# Patient Record
(demographics unavailable — no encounter records)

---

## 2022-04-11 NOTE — EDPHYS
Physician Documentation                                                                           

 [facility]                                                                 

Name: Snow Huerta                                                                             

Age: 57 yrs                                                                                       

Sex: Female                                                                                       

: 1964                                                                                   

MRN: [id number]                                                                                   

Arrival Date: 2022                                                                          

Time: 10:43                                                                                       

Account#: [id number]                                                                            

Bed 9                                                                                             

Private MD:                                                                                       

ED Physician Basim Mack                                                                         

HPI:                                                                                              

                                                                                             

10:48 This 57 yrs old Female presents to ER via Ambulatory with complaints of Nose Bleed.     jmm 

10:48 The patient presents with a nose bleed, that is apparently anterior. Onset: The         jmm 

      symptoms/episode began/occurred yesterday. Modifying factors: The symptoms are              

      alleviated by nothing. the symptoms are aggravated by nothing. This is a 57-year-old        

      female with history of hypertension the presents emerged department with complaints of      

      nosebleed. Nosebleed initially began last night which was relieved with pressure.           

      Nosebleed returned this morning and patient was unable to control the the bleed             

      herself..                                                                                   

                                                                                                  

Historical:                                                                                       

- Allergies:                                                                                      

10:51 No Known Allergies;                                                                     jd3 

- Home Meds:                                                                                      

10:51 lisinopril Oral [Active];                                                               jd3 

- PMHx:                                                                                           

10:51 Hypertension;                                                                           jd3 

                                                                                                  

- Immunization history:: Adult Immunizations up to date, Client reports having NOT                

  received the Covid vaccine. Flu vaccine is not up to date.                                      

- Social history:: Smoking status: Patient denies any tobacco usage or history of.                

                                                                                                  

                                                                                                  

ROS:                                                                                              

10:48 Constitutional: Negative for fever, chills, and weight loss.                            jmm 

10:48 ENT: Positive for nose bleed.                                                               

10:48 All other systems are negative.                                                             

                                                                                                  

Exam:                                                                                             

10:48 Constitutional:  This is a well developed, well nourished patient who is awake, alert,  jmm 

      and in no acute distress. Head/Face:  atraumatic. Eyes:  EOMI, no conjunctival erythema     

      appreciated                                                                                 

10:48 Neck:  Trachea midline, Supple Chest/axilla:  Normal chest wall appearance and motion.      

       Cardiovascular:  Regular rate and rhythm.  No edema appreciated Respiratory:  Normal       

      respirations, no respiratory distress appreciated Abdomen/GI:  Non distended, soft          

      Back:  Normal ROM Skin:  General appearance color normal MS/ Extremity:  Moves all          

      extremities, no obvious deformities appreciated, no edema noted to the lower                

      extremities  Neuro:  Awake and alert Psych:  Behavior is normal, Mood is normal,            

      Patient is cooperative and pleasant                                                         

10:48 ENT: Nose: clotted blood, in both nares, Posterior pharynx: Blood clots noted to the        

      posterior pharynx.                                                                          

                                                                                                  

Vital Signs:                                                                                      

10:51  / 129; Pulse 120; Resp 19 S; Temp 97.5(TE); Pulse Ox 99% on R/A; Weight 76.2 kg  jd3 

      (R); Height 5 ft. 4 in. (162.56 cm) (R); Pain 0/10;                                         

13:56  / 90; Pulse 90; Resp 16; Pulse Ox 100% on R/A; Pain 0/10;                        ss  

10:51 Body Mass Index 28.84 (76.20 kg, 162.56 cm)                                             jd3 

                                                                                                  

Procedures:                                                                                       

15:45 Performed Rhino Rocket insertion. Rhino Rocket was inserted in left nostril. No         [facility] 

      bleeding appreciated after insertion..                                                      

                                                                                                  

MDM:                                                                                              

11:00 Patient medically screened.                                                             [facility] 

14:31 Data reviewed: vital signs, nurses notes. Counseling: I had a detailed discussion with  [facility] 

      the patient and/or guardian regarding: the historical points, exam findings, and any        

      diagnostic results supporting the discharge/admit diagnosis, the need for outpatient        

      follow up, to return to the emergency department if symptoms worsen or persist or if        

      there are any questions or concerns that arise at home.                                     

                                                                                                  

                                                                                             

13:47 Order name: Vital Signs; Complete Time: 13:57                                           [facility] 

                                                                                                  

Administered Medications:                                                                         

11:09 Drug: Ondansetron 4 mg Route: PO;                                                       jd3 

14:45 Follow up: Response: No adverse reaction                                                ss  

11:09 Drug: Lisinopril 40 mg Route: PO;                                                       jd3 

14:44 Follow up: Response: No adverse reaction; Blood pressure is lowered                     ss  

11:12 Drug: Anders-Synephrine (phenylephrine) Spray 0.5 % 2 sprays Route: Intranasal; Site: left jd3 

      nare;                                                                                       

11:16 Drug: Norco (HYDROcodone-acetaminophen) 10 mg-325 mg 1 tabs Route: PO;                  jd3 

13:57 Follow up: Response: No adverse reaction; Pain is decreased                             ss  

                                                                                                  

                                                                                                  

Disposition:                                                                                      

19:13 Co-signature as Attending Physician, Basim Mack MD.                                    rn  

                                                                                                  

Disposition Summary:                                                                              

22 14:31                                                                                    

Discharge Ordered                                                                                 

      Location: Home                                                                          [facility] 

      Condition: Stable                                                                       [facility] 

      Diagnosis                                                                                   

        - Epistaxis                                                                           [facility] 

      Followup:                                                                               [facility] 

        - With: Polly Hannah MD                                                             

        - When: 1 - 2 days                                                                         

        - Reason: Recheck today's complaints, Continuance of care, Re-evaluation by your           

      physician                                                                                   

      Discharge Instructions:                                                                     

        - Discharge Summary Sheet                                                             [facility] 

        - Nosebleed, Adult                                                                    [facility] 

      Forms:                                                                                      

        - Medication Reconciliation Form                                                      [facility] 

        - Thank You Letter                                                                    pamela 

        - Antibiotic Education                                                                [facility] 

        - Prescription Opioid Use                                                             [facility] 

      Prescriptions:                                                                              

        - Clindamycin HCl 300 mg Oral Capsule                                                      

            - take 1 capsule by ORAL route every 6 hours for 10 days; 40 capsule; Refills: 0, [facility] 

      Product Selection Permitted                                                                 

        - Lisinopril 20 mg Oral Tablet                                                             

            - take 1 tablet by ORAL route once daily; 90 tablet; Refills: 0, Product          [facility] 

      Selection Permitted                                                                         

Signatures:                                                                                       

Vitaly Shelley PA PA jmm Nieto, Roman, MD MD rn Davies, Jonathon, RN RN jd3 Smirch, Shelby RN   ss                                                   

                                                                                                  

**************************************************************************************************

## 2022-04-11 NOTE — ER
Nurse's Notes                                                                                     

 [facility]                                                                 

Name: Snow Huerta                                                                             

Age: 57 yrs                                                                                       

Sex: Female                                                                                       

: 1964                                                                                   

MRN: [id number]                                                                                   

Arrival Date: 2022                                                                          

Time: 10:43                                                                                       

Account#: [id number]                                                                            

Bed 9                                                                                             

Private MD:                                                                                       

Diagnosis: Epistaxis                                                                              

                                                                                                  

Presentation:                                                                                     

                                                                                             

10:48 Chief complaint: Patient states: "my nose started bleeding last night, but it stopped.  jd3 

      I woke up this morning at 0700 and it has not stopped bleeding since. I am having huge      

      clots coming out now. no blood thinners.". Coronavirus screen: At this time, the client     

      does not indicate any symptoms associated with coronavirus-19. Ebola Screen: No             

      symptoms or risks identified at this time. Initial Sepsis Screen: Does the patient meet     

      any 2 criteria? No. Patient's initial sepsis screen is negative. Does the patient have      

      a suspected source of infection? No. Patient's initial sepsis screen is negative. Risk      

      Assessment: Do you want to hurt yourself or someone else? Patient reports no desire to      

      harm self or others. Onset of symptoms was 2022.                                  

10:48 Method Of Arrival: Ambulatory                                                           jd3 

10:48 Acuity: SALO 4                                                                           jd3 

                                                                                                  

Historical:                                                                                       

- Allergies:                                                                                      

10:51 No Known Allergies;                                                                     jd3 

- Home Meds:                                                                                      

10:51 lisinopril Oral [Active];                                                               jd3 

- PMHx:                                                                                           

10:51 Hypertension;                                                                           jd3 

                                                                                                  

- Immunization history:: Adult Immunizations up to date, Client reports having NOT                

  received the Covid vaccine. Flu vaccine is not up to date.                                      

- Social history:: Smoking status: Patient denies any tobacco usage or history of.                

                                                                                                  

                                                                                                  

Screenin:45 Abuse screen: Denies threats or abuse. Denies injuries from another. Nutritional        ss  

      screening: No deficits noted. Tuberculosis screening: Never had TB. Fall Risk None          

      identified.                                                                                 

                                                                                                  

Assessment:                                                                                       

12:45 General: Appears in no apparent distress. comfortable, Behavior is calm, cooperative,   ss  

      Pt states, "I feel better. It's not running down my throat like it was." Rhino rocket       

      remains in place. Will continue to monitor patient for any change in bleeding. Pain:        

      Denies pain. Neuro: Level of Consciousness is awake, alert, obeys commands, Oriented to     

      person, place, time, situation. Cardiovascular: Capillary refill < 3 seconds is brisk       

      in bilateral fingers. Respiratory: Airway is patent Respiratory effort is even,             

      unlabored, Respiratory pattern is regular, symmetrical. GI: Patient currently denies        

      diarrhea, nausea, vomiting. EENT: Nares are clear. Derm: Skin is intact, is healthy         

      with good turgor, Skin is dry, Skin is pink, warm \T\ dry. normal.                          

                                                                                                  

Vital Signs:                                                                                      

10:51  / 129; Pulse 120; Resp 19 S; Temp 97.5(TE); Pulse Ox 99% on R/A; Weight 76.2 kg  jd3 

      (R); Height 5 ft. 4 in. (162.56 cm) (R); Pain 0/10;                                         

13:56  / 90; Pulse 90; Resp 16; Pulse Ox 100% on R/A; Pain 0/10;                        ss  

10:51 Body Mass Index 28.84 (76.20 kg, 162.56 cm)                                             [facility] 

                                                                                                  

ED Course:                                                                                        

10:43 Patient arrived in ED.                                                                  mr  

10:49 Triage completed.                                                                       [facility] 

10:51 Vitaly Shelley PA is PHCP.                                                              [facility] 

10:51 Basim Mack MD is Attending Physician.                                                [facility] 

10:52 Arm band placed on.                                                                     jd3 

11:03 Nurse Practitioner and/or Physician Assistant to see patient.                           jd3 

11:03 Patient notified of wait time.                                                          jd3 

12:45 Madisyn Sandoval, RN is Primary Nurse.                                                    ss  

12:45 Patient has correct armband on for positive identification. Bed in low position. Call   ss  

      light in reach. Noise minimized. Lights dimmed. Warm blanket given.                         

14:31 Polly Hannah MD is Referral Physician.                                           [facility] 

14:43 No provider procedures requiring assistance completed. Patient did not have IV access   ss  

      during this emergency room visit.                                                           

                                                                                                  

Administered Medications:                                                                         

11:09 Drug: Ondansetron 4 mg Route: PO;                                                       jd3 

14:45 Follow up: Response: No adverse reaction                                                  

11:09 Drug: Lisinopril 40 mg Route: PO;                                                       jd3 

14:44 Follow up: Response: No adverse reaction; Blood pressure is lowered                     ss  

11:12 Drug: Anders-Synephrine (phenylephrine) Spray 0.5 % 2 sprays Route: Intranasal; Site: left j 

      nare;                                                                                       

11:16 Drug: Norco (HYDROcodone-acetaminophen) 10 mg-325 mg 1 tabs Route: PO;                  jd3 

13:57 Follow up: Response: No adverse reaction; Pain is decreased                             ss  

                                                                                                  

                                                                                                  

Outcome:                                                                                          

14:31 Discharge ordered by MD.                                                                cara 

14:43 Discharged to home ambulatory.                                                            

14:43 Condition: good                                                                             

14:43 Discharge instructions given to patient, Instructed on discharge instructions, follow       

      up and referral plans. medication usage, Demonstrated understanding of instructions,        

      follow-up care, medications, Prescriptions given X 2.                                       

14:51 Patient left the ED.                                                                      

                                                                                                  

Signatures:                                                                                       

Vitaly Shelley PA PA jmm Rivera, Mary                                 mr                                                   

Madisyn Sandoval, RN                      RN                                                      

Jorge Mo RN                    RN   jd3                                                  

                                                                                                  

Corrections: (The following items were deleted from the chart)                                    

11:32 10:48 Acuity: SALO 3 jd3                                                                 jd3 

                                                                                                  

**************************************************************************************************

## 2022-04-11 NOTE — XMS REPORT
Continuity of Care Document

                            Created on:2022



Patient:NINFA SHEA

Sex:Female

:1964

External Reference #:[id number]





Demographics







                          Address                   [address] 



                                                    [place] [address]

 

                          Home Phone                [phone]

 

                          Work Phone                [phone]

 

                          Email Address             [address]

 

                          Preferred Language        English

 

                          Marital Status            Unknown

 

                          Shinto Affiliation     Unknown

 

                          Race                      Unknown

 

                          Additional Race(s)        Unavailable

 

                          Ethnic Group              Unknown









Author







                          Organization              [facility]

t

 

                          Address                   [address]



                                                    [place] [address]

 

                          Phone                     [phone]









Support







                Name            Relationship    Address         Phone

 

                MONSE MARTINEZ               Unavailable     Unavailable

 

                MONSE CLEMENT               [address] [place] [address] 









Care Team Providers







                    Name                Role                Phone

 

                    PCP,  DOES NOT HAVE A Primary Care Physician Unavailable

 

                    FERNANDO           Attending Clinician Unavailable

 

                    RANI BAY         Attending Clinician Unavailable

 

                    FERNANDO           Admitting Clinician Unavailable









Problems

This patient has no known problems.



Allergies, Adverse Reactions, Alerts







       Allergy Allergy Status Severity Reaction(s) Onset  Inactive Treating Comm

ents 

Source



       Name   Type                        Date   Date   Clinician        

 

       NO KNOWN Drug   Active                                           [facility]



       ALLERGIE Class                                                   [facility]







Medications

This patient has no known medications.



Procedures

This patient has no known procedures.



Encounters







        Start   End     Encounter Admission Attending Care    Care    Encounter 

Source



        Date/Time Date/Time Type    Type    Clinicians Facility Department ID   

   

 

        2020 Emergency X               [facility]    [facility]     [id number]

[facility]



        14:45:00 14:45:00                                                 [facility]

 

        [facility]     [id number]

[address] [facility]



        10:16:47 14:51:00                 [facility]

 

        2020 [facility]     [id number]

[facility]



        10:16:47 10:16:47                 NOREEN                          [facility]







Results







           Test Description Test Time  Test Comments Results    Result Comments 

Source









                    COMPREHENSIVE METABOLIC PANEL 2022 05:59:38 









                      Test Item  Value      Reference Range Interpretation Comme

nts









             GLUCOSE (test code = 2217) 93 MG/DL     70-99                     

 

             BUN (test code = 2208) 17 MG/DL     6-20                      

 

             CREATININE (test code = 0.63 MG/DL   0.60-1.30                 



             )                                               

 

             eGFR ( CKD-EPI) (test 103 ML/MIN/1.73 >60                      

 



             code = 18759)                                        

 

             CALC BUN/CREAT (test code = 27 RATIO     6-28                      



             )                                               

 

             SODIUM (test code = 223) 142 MEQ/L    133-146                   

 

             POTASSIUM (test code = 4.1 MEQ/L    3.5-5.4                   



             )                                               

 

             CHLORIDE (test code = 221) 102 MEQ/L                        

 

             CARBON DIOXIDE (test code = 25 MEQ/L     19-31                     



             )                                               

 

             CALCIUM (test code = ) 9.1 MG/DL    8.5-10.5                  

 

             PROTEIN, TOTAL (test code = 7.6 G/DL     6.1-8.3                   



             )                                               

 

             ALBUMIN (test code = ) 4.8 G/DL     3.5-5.2                   

 

             CALC GLOBULIN (test code = 2.8 G/DL     1.9-3.7                   



             )                                               

 

             CALC A/G RATIO (test code = 1.7 RATIO    1.0-2.6                   



             )                                               

 

             BILIRUBIN, TOTAL (test code 0.9 MG/DL    See_Comment               

 [Automated message] The



             = )                                             system which ge

nerated this



                                                                 result transmit

david reference



                                                                 range: <=1.2. T

he reference



                                                                 range was not u

sed to



                                                                 interpret this 

result as



                                                                 normal/abnormal

.

 

             ALKALINE PHOSPHATASE (test 78 U/L                           



             code = 220)                                        

 

             AST (test code = 2218) 26 U/L       9-40                      

 

             ALT (test code = 2219) 24 U/L       5-40                           

  UNLESS OTHERWISE



                                                                 INDICATED, ALL 

TESTING



                                                                 PERFORMED [facility]



                                                                 PATHOLOGY LABOR

[facility]



                                                                  [address] [place] [address]



                                                                 [id number]          

LABORATORY



                                                                 DIRECTOR:  KADEEM GODDARD M.D.

      CLIA



                                                                 NUMBER [id number]

[id number]  [id number]



                                                                 ACCREDITATION N

[id number]. [id number]